# Patient Record
Sex: FEMALE | Race: WHITE | Employment: UNEMPLOYED | URBAN - METROPOLITAN AREA
[De-identification: names, ages, dates, MRNs, and addresses within clinical notes are randomized per-mention and may not be internally consistent; named-entity substitution may affect disease eponyms.]

---

## 2023-11-03 ENCOUNTER — OFFICE VISIT (OUTPATIENT)
Dept: DENTISTRY | Facility: CLINIC | Age: 38
End: 2023-11-03

## 2023-11-03 DIAGNOSIS — K02.9 DENTAL CARIES: Primary | ICD-10-CM

## 2023-11-03 DIAGNOSIS — Z01.20 ENCOUNTER FOR DENTAL EXAMINATION: ICD-10-CM

## 2023-11-03 PROCEDURE — D0150 COMPREHENSIVE ORAL EVALUATION - NEW OR ESTABLISHED PATIENT: HCPCS | Performed by: DENTIST

## 2023-11-03 PROCEDURE — D0210 INTRAORAL - COMPLETE SERIES OF RADIOGRAPHIC IMAGES: HCPCS

## 2023-11-03 NOTE — PATIENT INSTRUCTIONS
Higiene oral   LO QUE NECESITA SABER:   El cuidado bucal previene infecciones, placa y sangrado de las encías, llagas al igual que las caries. También refresca el aliento y mejora el apetito. Realice la higiene bucal en las [place], después de cada comida y antes de acostarse en la noche. Es probable que necesite ayaan higiene dental con más frecuencia si tiene edward oral deficiente. [facility]:   Artículos que se usan para el cuidado oral:  Un cepillo de dientes eléctrico o manual    Pasta de dientes, palillos dentales e hilo dental    Un vaso con agua para enjuagarse    Un enjuague bucal    Un humectante labial a base de agua o ayaan bálsamo labial.    Cepille bandar dientes:  Moje el cepillo de dientes y colóquele ayaan pequeña cantidad de pasta dental.    Con cuidado coloque el cepillo en cada diente y muévalo en forma circular. No presione muy bryant. Guion puede ocasionar lesiones en las encías. Limpie la superficie del diente en el interior, afuera y por encima. Cepille bandar encías y la parte superior de la [place]. Enjuague con agua lamb boca y escúpala. Repita hua proceso con un enjuague bucal.    Seque alrededor de lamb boca. Aplique un humectante labial o ayaan barrita de cacao para evitar que bandar labios se resequen o se cuarteen. Usar carlita dental en bandar dientes: Ensarte el hilo entre cada diente, jerri no lo empuje hacia las encías muy bryant. Guion puede provocar daño a las encías. Asegúrese de usar el hilo en cada lado de los dientes. Es posible que necesite utilizar hilo dental encerado para movilizar el hilo con facilidad entre cada diente. La limpieza de las dentaduras: Retire las dentaduras de lamb boca antes de limpiarlas. Las dentaduras salen con más facilidad si están húmedas. [place] un sorbo de agua para quitarse las dentaduras. Con cuidado mueva las dentaduras de lado a lado para aflojarlas. Luego sáquelas.   Cepille las dentaduras con agua hattie y el limpiador o la pasta para dentaduras. Cepille la totalidad de la superficie de las dentaduras con agua fría. No use [place]. No utilice [place]. Tenga cuidado de no doblar ningún broche de la dentadura mientras la cepilla. Enjuáguela. Pregunte a lamb médico cuál es el "[employer] debe cliniq.ly. Pregúntele a lamb médico cuál es el "[employer]" debe cliniq.ly. Remoje las dentaduras en la solución indicada cada noche después de limpiarlas. Debe enjuagar las dentaduras con agua fría cuando se las Paullette Cruz a colocar en lamb boca nuevamente. Programe ayaan thomas de seguimiento cada 6 meses con lamb médico o augustine le indiquen: Anote bandar preguntas para que se acuerde de hacerlas wali bandar visitas. Comuníquese con lamb médico o dentista si:  Usted desarrolla aftas o llagas en la boca. Las dentaduras no le quedan marilee Umm. Al cepillarse siente dolor. Usted tiene preguntas o inquietudes acerca de lamb condición o cuidado. © Copyright [facility] 2023 Information is for End User's use only and may not be sold, redistributed or otherwise used for commercial purposes. Esta información es sólo para uso en educación. Lamb intención no es darle un consejo médico sobre enfermedades o tratamientos. Colsulte con lamb Ishan Shena farmacéutico antes de seguir cualquier régimen médico para saber si es seguro y efectivo para usted.

## 2023-11-03 NOTE — DENTAL PROCEDURE DETAILS
Juanjose Carter presents for a Comprehensive exam. Verbal consent for treatment given in addition to the forms. Reviewed health history - Patient is ASA I  Consents signed: Yes     Pain Scale: 5    Radiographs: Complete mouth series     Oral Hygiene instruction reviewed and given. Recommended Hygiene recall visits with the [facility]. Reason for visit:Comprehensive Exam  Rooming performed via phone translation #[id number]  Rooming Includes:  Dental Vitals recorded. Allergies Reviewed  Medication Reviewed. Dental Health Compliance: Twice daily brushing, never flossing, use of fluoride toothpaste. Medical History Reviewed. ASA 1 - Normal health patient. Patient has complaints of pain/infection lower left tooth. Patient currently taking amoxicillin 500mg prescribed by another dentist for pain/infection on lower left tooth. Patient presents for exam appointment. Treatment provided includes comprehensive exam performed by Dr. Tere Rosales and Full mouth series of xrays taken to evaluate overall health and decay. Intraoral exam/Oral Cancer Screening presents with no significant findings. [address] due November 2024. Next visit:prophy, restorative, and referral to endodontic specialist  Treatment plan review completed via phone translation [id number] and [phone].

## 2023-11-08 ENCOUNTER — OFFICE VISIT (OUTPATIENT)
Dept: DENTISTRY | Facility: CLINIC | Age: 38
End: 2023-11-08

## 2023-11-08 VITALS — SYSTOLIC BLOOD PRESSURE: 118 MMHG | DIASTOLIC BLOOD PRESSURE: 78 MMHG | TEMPERATURE: 97.5 F | HEART RATE: 59 BPM

## 2023-11-08 DIAGNOSIS — K02.52 CARIES LESION, OCCLUSAL SURFACE, MODERATE, ACTIVE: Primary | ICD-10-CM

## 2023-11-08 PROCEDURE — D2391 RESIN-BASED COMPOSITE - 1 SURFACE, POSTERIOR: HCPCS | Performed by: DENTIST

## 2023-11-08 NOTE — DENTAL PROCEDURE DETAILS
Composite Filling #16 Occlusal    Angy Lancaster presents for composite filling. Patient consent treatment. PMH reviewed, no changes. ASA: I  DX: tooth #16 deep occlusal caries. Discussed with patient need for RCT if pulp exposure occurs or in future if pulp is inflamed. Pt understands and consents. Applied topical benzocaine, administered carps 2% lido 1:100k epi via maxillary infiltration. Prepped tooth #16 occlusal with 245 cameron on high speed. Caries removed with round carbide on slow speed. Isolation with cotton rolls and dri-angles  Etch with 37% H2PO4, rinse, dry. Applied Adhese with 20 second scrub once, gentle air dry and light cured for 10s. Restored with Tetric bulk yasmani shade A2 and light cured. Refined with finishing burs, polished with enhance point. Verified occlusion and contacts. POI is given. Pt left satisfied and ambulatory. NV: Restoration tooth #13.